# Patient Record
Sex: MALE | Race: BLACK OR AFRICAN AMERICAN | URBAN - METROPOLITAN AREA
[De-identification: names, ages, dates, MRNs, and addresses within clinical notes are randomized per-mention and may not be internally consistent; named-entity substitution may affect disease eponyms.]

---

## 2022-07-21 ENCOUNTER — OFFICE VISIT (OUTPATIENT)
Dept: FAMILY MEDICINE | Facility: CLINIC | Age: 52
End: 2022-07-21

## 2022-07-21 VITALS
WEIGHT: 161.2 LBS | DIASTOLIC BLOOD PRESSURE: 70 MMHG | TEMPERATURE: 97.5 F | HEIGHT: 71 IN | HEART RATE: 78 BPM | OXYGEN SATURATION: 98 % | SYSTOLIC BLOOD PRESSURE: 115 MMHG | BODY MASS INDEX: 22.57 KG/M2 | RESPIRATION RATE: 14 BRPM

## 2022-07-21 DIAGNOSIS — M77.8 TENDINITIS OF LEFT WRIST: Primary | ICD-10-CM

## 2022-07-21 ASSESSMENT — PAIN SCALES - GENERAL: PAINLEVEL: MODERATE PAIN (4)
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
## 2022-07-22 NOTE — PATIENT INSTRUCTIONS
Patient Visit Summary    Patient Name: Miky Jansen  MRN: [id number]    Date of Visit: 7/21/2022    Principle Diagnosis: wrist pain    Physician's Recommendations/Instructions: Hand therapy here at [facility] and Voltaren gel, prescription filled at [facility].    Follow Up/Results: [facility] physical therpay is open every Monday and Wednesday evening.    Physician:   Dr. Thom Gomez
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
## 2022-07-25 ENCOUNTER — APPOINTMENT (OUTPATIENT)
Dept: FAMILY MEDICINE | Facility: CLINIC | Age: 52
End: 2022-07-25
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
